# Patient Record
Sex: FEMALE
[De-identification: names, ages, dates, MRNs, and addresses within clinical notes are randomized per-mention and may not be internally consistent; named-entity substitution may affect disease eponyms.]

---

## 2017-05-05 ENCOUNTER — RESULT REVIEW (OUTPATIENT)
Age: 52
End: 2017-05-05

## 2018-07-27 ENCOUNTER — RESULT REVIEW (OUTPATIENT)
Age: 53
End: 2018-07-27

## 2020-01-10 ENCOUNTER — RESULT REVIEW (OUTPATIENT)
Age: 55
End: 2020-01-10

## 2022-06-02 ENCOUNTER — RESULT REVIEW (OUTPATIENT)
Age: 57
End: 2022-06-02

## 2023-12-18 ENCOUNTER — APPOINTMENT (OUTPATIENT)
Dept: ORTHOPEDIC SURGERY | Facility: CLINIC | Age: 58
End: 2023-12-18
Payer: COMMERCIAL

## 2023-12-18 VITALS — HEIGHT: 69 IN | BODY MASS INDEX: 31.4 KG/M2 | WEIGHT: 212 LBS

## 2023-12-18 DIAGNOSIS — M54.16 RADICULOPATHY, LUMBAR REGION: ICD-10-CM

## 2023-12-18 PROCEDURE — 72100 X-RAY EXAM L-S SPINE 2/3 VWS: CPT

## 2023-12-18 PROCEDURE — 99204 OFFICE O/P NEW MOD 45 MIN: CPT

## 2023-12-18 NOTE — PHYSICAL EXAM
[Normal] : Gait: normal [Rivera's Sign] : negative Rivera's sign [Pronator Drift] : negative pronator drift [SLR] : negative straight leg raise [de-identified] : 5 out of 5 motor strength, sensation is intact and symmetrical full range of motion flexion extension and rotation, no palpatory tenderness full range of motion of hips knees shoulders and elbows (all four extremities), no atrophy, negative straight leg raise, no pathological reflexes, no swelling, normal ambulation, no apparent distress skin is intact, no palpable lymph nodes, no upper or lower extremity instability, alert and oriented x3 and normal mood. Normal finger-to nose test.  [de-identified] : XR AP Lat Lumbar 12/18/2023 - L3-S1 disc degenerative disease -reviewed with patient.

## 2023-12-18 NOTE — DISCUSSION/SUMMARY
[de-identified] : L3-S1 disc degenerative disease Left-sided radiculopathy. Discussed all options. Diclofenac PRN. Lumbar MRI. F/u in 2 weeks. All options discussed including rest, medicine, home exercise, acupuncture, Chiropractic care, Physical Therapy, Pain management, and last resort surgery. All questions were answered, all alternatives discussed and the patient is in complete agreement with the treatment plan which the patient contributed to and discussed with me through the shared decision making process. Follow-up appointment as instructed. Any issues and the patient will call or come in sooner.  Thank you for the consultation.

## 2023-12-18 NOTE — CONSULT LETTER
[Dear  ___] : Dear  [unfilled], [Consult Letter:] : I had the pleasure of evaluating your patient, [unfilled]. [Please see my note below.] : Please see my note below. [Consult Closing:] : Thank you very much for allowing me to participate in the care of this patient.  If you have any questions, please do not hesitate to contact me. [Sincerely,] : Sincerely, [FreeTextEntry2] : Dr. Stuart Ernst [FreeTextEntry3] : Dr. Sergio Martin MD

## 2023-12-18 NOTE — HISTORY OF PRESENT ILLNESS
[de-identified] : 58 year old female presents for evaluation of lower back pain with radiation down the leg x 3 weeks.  Last seen in 2015 for LLE radiculopathy, was referred to pain management for an CLAUDIA at the time. Underwent LESI x 3 which helped up until recently. She states that she bent over and felt the pain in the sacrum, which radiates to the anterior thigh with numbness at the lower calf and numbness/tingling. Sitting aggravates the pain. Took advil for the pain.  Has not tried PT or chiropractic care. She states she performed lumbar HEP for the past 3 weeks which helps temporarily. No recent injections. Is feeling slightly better. PMHx: HTN Lower back pain into hip from sitting. No fever chills sweats nausea vomiting no bowel or bladder dysfunction, no recent weight loss or gain no night pain. This history is in addition to the intake form that I personally reviewed.  [Stable] : stable

## 2023-12-28 ENCOUNTER — NON-APPOINTMENT (OUTPATIENT)
Age: 58
End: 2023-12-28

## 2024-01-05 ENCOUNTER — APPOINTMENT (OUTPATIENT)
Dept: ORTHOPEDIC SURGERY | Facility: CLINIC | Age: 59
End: 2024-01-05
Payer: COMMERCIAL

## 2024-01-05 DIAGNOSIS — M51.36 OTHER INTERVERTEBRAL DISC DEGENERATION, LUMBAR REGION: ICD-10-CM

## 2024-01-05 DIAGNOSIS — M48.07 SPINAL STENOSIS, LUMBOSACRAL REGION: ICD-10-CM

## 2024-01-05 PROCEDURE — 99214 OFFICE O/P EST MOD 30 MIN: CPT | Mod: 95

## 2024-01-05 RX ORDER — DICLOFENAC SODIUM 75 MG/1
75 TABLET, DELAYED RELEASE ORAL
Qty: 60 | Refills: 1 | Status: ACTIVE | COMMUNITY
Start: 2024-01-05 | End: 1900-01-01

## 2024-01-05 NOTE — HISTORY OF PRESENT ILLNESS
[de-identified] : 58 year old female presents for evaluation of lower back pain with radiation down the leg x 3 weeks.  Last seen in 2015 for LLE radiculopathy, was referred to pain management for an CLAUDIA at the time. Underwent LESI x 3 which helped up until recently. She states that she bent over and felt the pain in the sacrum, which radiates to the anterior thigh with numbness at the lower calf and numbness/tingling. Sitting aggravates the pain. Took advil for the pain.  Feeling better. Has not tried PT or Chiropractic care. She states she performed lumbar HEP for the past 3 weeks which helps temporarily. No recent injections. Presents today for MRI Lumbar review.  PMHx: HTN Lower back pain into hip from sitting. No fever chills sweats nausea vomiting no bowel or bladder dysfunction, no recent weight loss or gain no night pain. This history is in addition to the intake form that I personally reviewed.  [Improving] : improving

## 2024-01-05 NOTE — PHYSICAL EXAM
[Normal] : Gait: normal [Rivera's Sign] : negative Rivera's sign [Pronator Drift] : negative pronator drift [SLR] : negative straight leg raise [de-identified] : Adequate motor strength, sensation is intact and symmetrical full range of motion flexion extension and rotation, full range of motion of hips knees shoulders and elbows (all four extremities), no atrophy, negative straight leg raise, no swelling, normal ambulation, no apparent distress skin is intact, no upper or lower extremity instability, alert and oriented x3 and normal mood. Normal finger-to nose test. Adequate heal walk and toe walk.  [de-identified] :  MRI LUMBAR SPINE WITHOUT CONTRAST    12/26/23   (R)   HISTORY: Lower back pain.  TECHNIQUE: Multiplanar, multi-sequential MRI of the lumbar spine was obtained on a 3T scanner using a standard protocol.  COMPARISON:  MRI lumbar spine 6/16/2015.  FINDINGS:  For purposes of this dictation, the last well-formed disc space will be labeled L5-S1.  OSSEOUS STRUCTURES: Vertebral body heights are preserved. Diffusely heterogeneous marrow signal. No marrow edema.   ALIGNMENT: Normal lumbar lordosis is preserved. No spondylolisthesis. No spondylolysis within the limitations of MRI.  SPINAL CORD AND CONUS MEDULLARIS: The conus medullaris terminates at L1.  PARASPINAL AND INTRA-ABDOMINAL SOFT TISSUES: Unremarkable.  INCLUDED THORACIC SPINE AND SACRUM: Unremarkable.  DISCS: Moderate disc height loss at L3-4, L4-5 and L5-S1. Mild disc height loss at L2-3. Multilevel disc desiccation.  The following axial levels are imaged and detailed below: T12-L1: No disc bulging or herniation. No spinal canal or foraminal stenosis.  L1-L2: Mild disc bulge with a right foraminal disc protrusion with mild right foraminal stenosis. No spinal canal or left foraminal stenosis. Findings are similar compared to the prior.  L2-L3: Disc bulge, ligamentum flavum thickening and facet arthrosis with mild spinal canal stenosis and mild right foraminal stenosis. Findings are similar compared to the prior.  L3-L4: Disc bulge, ligamentum flavum thickening and facet arthrosis with moderate spinal canal stenosis, left lateral recess stenosis and mild bilateral foraminal stenosis. Findings have progressed.  L4-L5: Disc bulge, ligamentum flavum thickening and facet arthrosis with mild spinal canal stenosis and mild bilateral foraminal stenosis. Findings have progressed.  L5-S1: Mild disc bulge with annular fissure. No spinal canal or foraminal stenosis. Findings are similar compared to the prior.  IMPRESSION:  MRI of the lumbar spine demonstrates:  Multilevel lumbar spondylosis with mild to moderate spinal canal stenosis and multilevel mild foraminal stenosis, as described above.    XR AP Lat Lumbar 12/18/2023 - L3-S1 disc degenerative disease -reviewed with patient.

## 2024-01-05 NOTE — REASON FOR VISIT
[Follow-Up Visit] : a follow-up visit for [Home] : at home, [unfilled] , at the time of the visit. [Patient] : the patient [Other Location: e.g. Home (Enter Location, City,State)___] : at [unfilled]

## 2024-01-05 NOTE — DISCUSSION/SUMMARY
[de-identified] : L3-S1 disc degenerative disease Left-sided radiculopathy. Moderate L3-4 stenosis. Discussed all options. PT, NSAIDs PRN. All options discussed including rest, medicine, home exercise, acupuncture, Chiropractic care, Physical Therapy, Pain management, and last resort surgery. All questions were answered, all alternatives discussed and the patient is in complete agreement with the treatment plan which the patient contributed to and discussed with me through the shared decision making process. Follow-up appointment as instructed. Any issues and the patient will call or come in sooner.  Thank you for the consultation.

## 2024-03-14 NOTE — ADDENDUM
[FreeTextEntry1] : This note was written by Heidi Manley on 12/18/23 acting as scribe for Dr. Sergio Martin M.D. I, Sergio Martin MD, have read and attest that all the information, medical decision making, and discharge instructions within are true and accurate.
Yes